# Patient Record
Sex: MALE | URBAN - METROPOLITAN AREA
[De-identification: names, ages, dates, MRNs, and addresses within clinical notes are randomized per-mention and may not be internally consistent; named-entity substitution may affect disease eponyms.]

---

## 2020-02-16 ENCOUNTER — OFFICE VISIT (OUTPATIENT)
Dept: URGENT CARE | Age: 20
End: 2020-02-16

## 2020-02-16 VITALS
BODY MASS INDEX: 23.63 KG/M2 | TEMPERATURE: 99.5 F | HEIGHT: 66 IN | SYSTOLIC BLOOD PRESSURE: 138 MMHG | HEART RATE: 81 BPM | OXYGEN SATURATION: 99 % | WEIGHT: 147 LBS | RESPIRATION RATE: 18 BRPM | DIASTOLIC BLOOD PRESSURE: 70 MMHG

## 2020-02-16 DIAGNOSIS — R68.89 FLU-LIKE SYMPTOMS: Primary | ICD-10-CM

## 2020-02-16 LAB
FLUAV AG UPPER RESP QL IA.RAPID: NEGATIVE
FLUBV AG UPPER RESP QL IA.RAPID: NEGATIVE

## 2020-02-16 PROCEDURE — 87804 INFLUENZA ASSAY W/OPTIC: CPT | Performed by: PHYSICIAN ASSISTANT

## 2020-02-16 PROCEDURE — 99203 OFFICE O/P NEW LOW 30 MIN: CPT | Performed by: PHYSICIAN ASSISTANT

## 2020-02-16 RX ORDER — BENZONATATE 200 MG/1
200 CAPSULE ORAL 3 TIMES DAILY PRN
Qty: 21 CAPSULE | Refills: 0 | Status: SHIPPED | OUTPATIENT
Start: 2020-02-16 | End: 2020-02-23

## 2020-02-16 ASSESSMENT — ENCOUNTER SYMPTOMS
FEVER: 1
SORE THROAT: 0
SINUS PAIN: 0
COUGH: 1

## 2024-05-07 ENCOUNTER — OFFICE VISIT (OUTPATIENT)
Dept: INTERNAL MEDICINE CLINIC | Facility: CLINIC | Age: 24
End: 2024-05-07

## 2024-05-07 VITALS
HEIGHT: 65.5 IN | OXYGEN SATURATION: 99 % | WEIGHT: 156 LBS | BODY MASS INDEX: 25.68 KG/M2 | HEART RATE: 99 BPM | DIASTOLIC BLOOD PRESSURE: 82 MMHG | SYSTOLIC BLOOD PRESSURE: 130 MMHG | TEMPERATURE: 98 F

## 2024-05-07 DIAGNOSIS — Z83.438 FAMILY HISTORY OF HYPERLIPIDEMIA: ICD-10-CM

## 2024-05-07 DIAGNOSIS — Z13.29 SCREENING FOR THYROID DISORDER: ICD-10-CM

## 2024-05-07 DIAGNOSIS — Z00.00 ANNUAL PHYSICAL EXAM: Primary | ICD-10-CM

## 2024-05-07 DIAGNOSIS — Z13.1 SCREENING FOR DIABETES MELLITUS: ICD-10-CM

## 2024-05-07 DIAGNOSIS — Z13.220 SCREENING FOR LIPOID DISORDERS: ICD-10-CM

## 2024-05-07 DIAGNOSIS — Z13.0 SCREENING FOR DEFICIENCY ANEMIA: ICD-10-CM

## 2024-05-07 PROCEDURE — 99385 PREV VISIT NEW AGE 18-39: CPT | Performed by: INTERNAL MEDICINE

## 2024-05-07 PROCEDURE — 3075F SYST BP GE 130 - 139MM HG: CPT | Performed by: INTERNAL MEDICINE

## 2024-05-07 PROCEDURE — 3008F BODY MASS INDEX DOCD: CPT | Performed by: INTERNAL MEDICINE

## 2024-05-07 PROCEDURE — 3079F DIAST BP 80-89 MM HG: CPT | Performed by: INTERNAL MEDICINE

## 2024-05-07 NOTE — H&P
Nick Patel is a 23 year old male.    HPI:     Chief Complaint   Patient presents with    Establish Care     Mr. PATEL is a 23 year old male coming in for annual physical examination    Overall doing well. In his past physicals may have had some high intermittent blood pressures.  Concerned as he does have some family history of high blood pressure.    Used to have headaches - chronic migraines. It is about a year since.     Sleep 6-8 hours of sleep depending on white shift goes.     Some anxiety, describes self as overthinker sometimes. No restrictions on his activity however.  This is well manageable, does not need assistance with daily stress management    He does have some skin spots.  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  Some other freckles, but no pain, itchiness to these areas.    I reviewed and updated the PMHx, FamHx, medications, allergies, and SocHx as below with the patient    SocHX  - Home: feels safe at home; with fiancee. Moved back from [place].   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 Sports - volleyball.  - Nutrition: Mediterranean diet - nuts, salad bar at work. Beets/cucumbers/lópez beans. Not as much fried foods, red meat. More fish and white meat - chicken. Water  - Physical Activity: walking the dog, volleyball/pickleball 1-2x a week. Gym 1-2x a week 3x during the week      HISTORY:  History reviewed. No pertinent past medical history.   Past Surgical History:   Procedure Laterality Date    Repeat root canal molar      age 11    Tonsillectomy Bilateral     age 6      Family History   Problem Relation Age of Onset    Hypertension Mother     Lipids Mother     Other (Headache) Mother     Diabetes Father     Hypertension Father     Lipids Father       Social History:   Social History     Socioeconomic History    Marital status: Single   Tobacco Use    Smoking status: Never     Smokeless tobacco: Never   Vaping Use    Vaping status: Never Used   Substance and Sexual Activity    Alcohol use: Yes     Alcohol/week: 2.0 standard drinks of alcohol     Types: 2 Standard drinks or equivalent per week    Drug use: Never     Social Determinants of Health     Food Insecurity: No Food Insecurity (9/1/2022)    Received from [facility]    Food Insecurity     Within the past 30 days, I worried whether my food would run out before I got money to buy more. / En los últimos 30 días, me preocupó que la comida se podía acabar antes de tener dinero para compr...: Never true / Nunca     Within the past 30 days, the food that I bought just didn't last, and I didn't have money to get more. / En los últimos 30 días, La comida que compré no rindió lo suficiente, y no tenía dinero para...: Never true / Nunca        Medications (Active prior to today's visit):  No current outpatient medications on file.       Allergies:  No Known Allergies      ROS:   Positive Findings indicated in BOLD    Constitutional: Fever, Chills, Weight Gain, Weight Loss, Night Sweats, Fatigue, Malaise  ENT/Mouth:  Hearing Changes, Ear Pain, Nasal Congestion, Sinus Pain, Hoarseness, Sore throat, Rhinorrhea, Swallowing Difficulty  Eyes: Eye Pain, Swelling, Redness, Foreign Body, Discharge, Vision Changes  Cardiovascular: Chest Pain, SOB, PND, Dyspnea on Exertion, Orthopnea, Claudication, Edema, Palpitations  Respiratory: Cough, Sputum, Wheezing, Shortness of breath  Gastrointestinal: Nausea, Vomiting, Diarrhea, Constipation, Pain, Heartburn, Dysphagia, Bloody stools, Tarry stools  Genitourinary: Dysmenorrhea, Dysuria, Urinary Frequency, Hematuria, Urinary Incontinence, Urgency,  Flank Pain  Musculoskeletal: Arthralgias, Myalgias, Joint Swelling, Joint Stiffness, Back Pain, Neck Pain  Integumentary: Skin Lesions, Pruritis, Hair Changes, Jaundice, Nail changes  Neuro: Weakness, Numbness, Paresthesias, Loss of Consciousness,  Syncope, Dizziness, Headache, Falls  Psych: Anxiety, Depression, Insomnia, Suicidal Ideation, Homicidal ideation, Memory Changes  Heme/Lymph: Bruising, Bleeding, Lymphadenopathy  Endocrine: Polyuria, Polydipsia, Temperature Intolerance    PHYSICAL EXAM:   Vital Signs:  Blood pressure 130/82, pulse 99, temperature 98.3 °F (36.8 °C), height 5' 5.5\" (1.664 m), weight 156 lb (70.8 kg), SpO2 99%.     Constitutional: No acute distress. Alert and oriented x 3.  Eyes: EOMI, PERRLA, clear sclera b/l  HENT: NCAT, Moist mucous membranes, Oropharynx without erythema or exudates  Neck: No JVD, no thyromegaly  Cardiovascular: S1, S2, no S3, no S4, Regular rate and rhythm, No murmurs/gallops/rubs.   Vascular: Equal pulses 2+ carotids no bruits or thrills/radial/DP/PT bilaterally  Respiratory: Clear to auscultation bilaterally.  No wheezes/rales/rhonchi  Gastrointestinal: Soft, nontender, nondistended. Positive bowel sounds x 4. No rebound tenderness. No hepatomegaly, No splenomegaly  Genitourinary: No CVA tenderness bilaterally  Neurologic: No focal neurological deficits, CN II-XII intact, light touch intact, MSK Strength 5/5 and symmetric in all extremities, normal gait, 2+ patellar tendon  Musculoskeletal: Full range of motion of all extremities, no clubbing/swelling/edema  Skin: + Left forearm hemangioma, intermittent scattered freckles/macules.  Psychiatric: Appropriate mood and affect  Heme/Lymph/Immune: No cervical LAD      DATA REVIEWED   Labs:  No results found for this or any previous visit (from the past 8760 hour(s)).    No results found for this or any previous visit (from the past 8760 hour(s)).    No results found for: \"CHOLEST\", \"HDL\", \"LDL\", \"TRIGLY\", \"TRIG\"    Last A1c value was  % done  .          ASSESSMENT/PLAN:     Whitecoat syndrome  - Blood pressure slightly elevated in the 130s systolic even on repeat.  - Does report history of mildly high blood pressure/borderline blood pressure elevation in the past  -  Suspect whitecoat hypertension/whitecoat syndrome.  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  This has not changed.  He does have some intermittent freckles, macules that appear benign.  We did discuss the ABCDEs of melanoma and suspicious skin lesions.  - Amenable to fasting blood work as below.  Family history of diabetes, hyperlipidemia.  - Otherwise he is doing very well in terms of nutrition, exercise, managing stress.  Goal is to maintain this optimal state of health moving forward         Orders This Visit:  Orders Placed This Encounter   Procedures    Comp Metabolic Panel (14)    CBC With Differential With Platelet    Lipid Panel    TSH W Reflex To Free T4       Meds This Visit:  Requested Prescriptions      No prescriptions requested or ordered in this encounter       Imaging & Referrals:  None       Health Maintenance  HTN Screen: BP at goal  DM Screen: Check fasting sugar  HLD Screen: Check fasting lipid panel  HCV Screen: Considered low risk  HIV Screen: considered low risk  G/C/Syphilis: Considered low risk    Colon Cancer Screening (45-70): Not indicated  Prostate Cancer Screening: (50-70): Not indicated  Lung Cancer Screening (55-79 with 30 p/year and active < 15 years): Not indicated  AAA Screening (65-75 Hx of smoking): Not indicated    Influenza: Annually   Td/Tdap: Last Tdap 2022, due 2032  Zoster (50+): Not indicated  HPV (19-26): Due for Gardasil vaccine series  Pneumococcal: Not indicated    Immunization History   Administered Date(s) Administered    Covid-19 Vaccine Pfizer 30 mcg/0.3 ml 02/20/2021, 03/09/2021, 12/20/2021    DTAP 04/05/2001    DTAP INFANRIX 10/25/1999, 12/29/1999, 03/07/2000, 08/28/2003    FLUMIST NASAL 2 YR-49 YRS (34113) 10/17/2013    HEP A,Ped/Adol,(2 Dose) 05/13/2010, 08/15/2012    HEP B, Ped/Adol 08/27/1999, 10/25/1999, 12/27/1999,  07/28/2014    HPV (Gardasil) 03/09/2011, 08/15/2012, 08/19/2013    Hib, Unspecified Formulation 10/25/1999, 12/27/1999, 12/29/1999, 04/05/2001, 06/03/2002    IPV 10/25/1999, 12/29/1999, 04/05/2001, 08/28/2003    Influenza 09/29/2020    MMR 03/22/2001, 08/28/2003    Meningococcal-Menactra 08/15/2012, 09/21/2015    OPV 12/27/1999, 04/05/2001    TDAP 05/13/2010, 09/01/2022    Tb Intradermal Test 05/15/2010    Varicella 03/22/2001, 05/13/2010         Return to clinic in 1 year for annual physical examination    Jl Mayorga MD, 05/07/24, 3:27 PM

## 2024-05-07 NOTE — PATIENT INSTRUCTIONS
- You were seen in clinic for regular annual check-up. We have ordered labs for you and we will call you with the results. Please obtain the bloodwork fasting for 10**12 hours. OK to drink water the day of your blood draw.      We have the lab downstairs on the first floor.  No appointment is necessary.  Lab hours are Monday-Friday 7:30 AM to 4:45 PM.  There may be Saturday hours 7 AM-11:00 AM as well.     Otherwise you can obtain the blood tests on the weekends at the main hospital or local immediate care/urgent care within the [facility].    Unless there is any significant abnormal blood test, we do not need to check annual blood test until after the age of 30.  It is reasonable to get a baseline to see how your cholesterol levels and glucose levels/sugar levels look given family history of these conditions    Blood pressure was only slightly high on repeat check.  This could be consistent with whitecoat syndrome/whitecoat hypertension which does not require medical treatment.  - Check your blood pressure periodically at home as they may be lower outside of the medical setting  - You are otherwise doing very well with good balanced nutrition, staying active throughout the daytime with exercise, and managing stress.    -We did discuss the ABCDEs of skin lesions.  These consist of asymmetry, irregular borders, abnormal coloration, diameter increase/size increase, evolution or changes.    Birthmark on the left arm is likely a strawberry hemangioma, these are benign collections of blood vessels.  Look out for any changes in this area    You have some benign-appearing moles and freckles throughout the body.  No need for evaluation with dermatology at this time    -Vaccines he may be due for: Up-to-date on all your major vaccines  - Please continue to eat a varied diet including recommended servings of vegetables, fruits, and low fat dairy. Minimize high saturated fats (such as fast foods) and high sugar  intake (such as soda)  - We recommend 150 minutes of moderate intensity exercise (brisk walking, swimming) weekly to maintain your current weight.  Targeted weight loss will require more vigorous exercise or more than 150 minutes/week.    Return to clinic in 1 year for annual physical examination

## 2024-08-21 ENCOUNTER — LAB ENCOUNTER (OUTPATIENT)
Dept: LAB | Age: 24
End: 2024-08-21
Attending: INTERNAL MEDICINE
Payer: COMMERCIAL

## 2024-08-21 DIAGNOSIS — Z13.1 SCREENING FOR DIABETES MELLITUS: ICD-10-CM

## 2024-08-21 DIAGNOSIS — Z13.220 SCREENING FOR LIPOID DISORDERS: ICD-10-CM

## 2024-08-21 DIAGNOSIS — Z13.0 SCREENING FOR DEFICIENCY ANEMIA: ICD-10-CM

## 2024-08-21 DIAGNOSIS — Z00.00 ANNUAL PHYSICAL EXAM: ICD-10-CM

## 2024-08-21 DIAGNOSIS — Z13.29 SCREENING FOR THYROID DISORDER: ICD-10-CM

## 2024-08-21 LAB
ALBUMIN SERPL-MCNC: 4.9 G/DL (ref 3.2–4.8)
ALBUMIN/GLOB SERPL: 1.8 {RATIO} (ref 1–2)
ALP LIVER SERPL-CCNC: 77 U/L
ALT SERPL-CCNC: 26 U/L
ANION GAP SERPL CALC-SCNC: 6 MMOL/L (ref 0–18)
AST SERPL-CCNC: 26 U/L (ref ?–34)
BASOPHILS # BLD AUTO: 0.05 X10(3) UL (ref 0–0.2)
BASOPHILS NFR BLD AUTO: 0.6 %
BILIRUB SERPL-MCNC: 0.7 MG/DL (ref 0.3–1.2)
BUN BLD-MCNC: 10 MG/DL (ref 9–23)
BUN/CREAT SERPL: 10.2 (ref 10–20)
CALCIUM BLD-MCNC: 9.9 MG/DL (ref 8.7–10.4)
CHLORIDE SERPL-SCNC: 106 MMOL/L (ref 98–112)
CHOLEST SERPL-MCNC: 213 MG/DL (ref ?–200)
CO2 SERPL-SCNC: 27 MMOL/L (ref 21–32)
CREAT BLD-MCNC: 0.98 MG/DL
DEPRECATED RDW RBC AUTO: 37.8 FL (ref 35.1–46.3)
EGFRCR SERPLBLD CKD-EPI 2021: 110 ML/MIN/1.73M2 (ref 60–?)
EOSINOPHIL # BLD AUTO: 0.19 X10(3) UL (ref 0–0.7)
EOSINOPHIL NFR BLD AUTO: 2.3 %
ERYTHROCYTE [DISTWIDTH] IN BLOOD BY AUTOMATED COUNT: 11.9 % (ref 11–15)
FASTING PATIENT LIPID ANSWER: YES
FASTING STATUS PATIENT QL REPORTED: YES
GLOBULIN PLAS-MCNC: 2.7 G/DL (ref 2–3.5)
GLUCOSE BLD-MCNC: 96 MG/DL (ref 70–99)
HCT VFR BLD AUTO: 43.7 %
HDLC SERPL-MCNC: 45 MG/DL (ref 40–59)
HGB BLD-MCNC: 15.3 G/DL
IMM GRANULOCYTES # BLD AUTO: 0.02 X10(3) UL (ref 0–1)
IMM GRANULOCYTES NFR BLD: 0.2 %
LDLC SERPL CALC-MCNC: 136 MG/DL (ref ?–100)
LYMPHOCYTES # BLD AUTO: 3.67 X10(3) UL (ref 1–4)
LYMPHOCYTES NFR BLD AUTO: 44.1 %
MCH RBC QN AUTO: 30.8 PG (ref 26–34)
MCHC RBC AUTO-ENTMCNC: 35 G/DL (ref 31–37)
MCV RBC AUTO: 88.1 FL
MONOCYTES # BLD AUTO: 0.5 X10(3) UL (ref 0.1–1)
MONOCYTES NFR BLD AUTO: 6 %
NEUTROPHILS # BLD AUTO: 3.9 X10 (3) UL (ref 1.5–7.7)
NEUTROPHILS # BLD AUTO: 3.9 X10(3) UL (ref 1.5–7.7)
NEUTROPHILS NFR BLD AUTO: 46.8 %
NONHDLC SERPL-MCNC: 168 MG/DL (ref ?–130)
OSMOLALITY SERPL CALC.SUM OF ELEC: 287 MOSM/KG (ref 275–295)
PLATELET # BLD AUTO: 221 10(3)UL (ref 150–450)
POTASSIUM SERPL-SCNC: 3.9 MMOL/L (ref 3.5–5.1)
PROT SERPL-MCNC: 7.6 G/DL (ref 5.7–8.2)
RBC # BLD AUTO: 4.96 X10(6)UL
SODIUM SERPL-SCNC: 139 MMOL/L (ref 136–145)
TRIGL SERPL-MCNC: 176 MG/DL (ref 30–149)
TSI SER-ACNC: 2.16 MIU/ML (ref 0.55–4.78)
VLDLC SERPL CALC-MCNC: 32 MG/DL (ref 0–30)
WBC # BLD AUTO: 8.3 X10(3) UL (ref 4–11)

## 2024-08-21 PROCEDURE — 80061 LIPID PANEL: CPT

## 2024-08-21 PROCEDURE — 80053 COMPREHEN METABOLIC PANEL: CPT

## 2024-08-21 PROCEDURE — 36415 COLL VENOUS BLD VENIPUNCTURE: CPT

## 2024-08-21 PROCEDURE — 84443 ASSAY THYROID STIM HORMONE: CPT

## 2024-08-21 PROCEDURE — 85025 COMPLETE CBC W/AUTO DIFF WBC: CPT

## 2024-08-22 ENCOUNTER — TELEPHONE (OUTPATIENT)
Dept: INTERNAL MEDICINE CLINIC | Facility: CLINIC | Age: 24
End: 2024-08-22

## 2024-08-22 NOTE — TELEPHONE ENCOUNTER
Please notify the patient that the cholesterol levels were elevated but no medication we need at this time.  We should try to focus on good vegetable, fruit intake and lean meats such as chicken and fish that slightly baked or oil, try to minimize red meat.  From our visit I believe he does follow the Mediterranean diet which is a good nutrition plan to follow    Keep up with good exercise to maintain a healthy weight.  No other new recommendations for now

## 2024-08-22 NOTE — TELEPHONE ENCOUNTER
[employer] message sent to patient with Dr Mayorga' message. Patient to contact office for any further questions.

## 2025-02-23 NOTE — PATIENT INSTRUCTIONS
You were seen in clinic today for follow-up.  Today, we ordered some blood test to be completed anytime prior to your next visit    For shoulder pain, we recommended:  Lets proceed with x-ray of the right shoulder to determine any structural abnormalities of the joint space.  -Would recommend initial trial of conservative therapy:    -Acetaminophen 500-650 mg every 4-6 hours as needed for pain relief  -Ibuprofen 200-400 mg over-the-counter twice a day for 5 days even if symptoms are improved.  -For more severe pain, should notify us as we can consider alternative medication  -Trial of physical therapy can be considered if home stretches and exercises insufficient    Similarly, we will proceed with conservative measures of the right knee.  No immediate x-rays needed at this time  - Lets try some stretches and exercises  - Use of a knee brace or compression brace especially during times of activities may help    As there is been changes to the skin, we will proceed with dermatology evaluation.    Periodically check blood pressures at home as we do have an element of whitecoat hypertension    Will proceed with dermatology evaluation as there is some irregularities of the left neck area.  - We discussed the ABCDEs of concerning moles, monitoring for melanoma/skin cancer in the future    Return to clinic in 1 year for annual physical exam

## 2025-02-23 NOTE — PROGRESS NOTES
Nick Patel is a 24 year old male.    HPI:     Chief Complaint   Patient presents with    Physical     Reviewed Preventative/Wellness form with patient.  Rt shoulder pain with certain movements x3ish months   Intermittent Rt knee pain   New moles - Gown for skin check   Started OTC Omeprazole with some relief  Migraines have decreased      Mr. PATEL is a 24 year old male past medical history of hyperlipidemia who presents today for follow-up.    Shoulder pain first started 3-4 months ago. Was playing volleyball for most of the last year. 1x every 2 weeks. He went to spike the ball and felt a sharp pain to the area. Now with lifting weights, he doesn't feel it. PLays volleyball 1-3 hours.     Right knee a few months ago, it was 1x. Last week he may not have stretched enough. Lunging with the R knee. He decreased weight of the R knee. Achy pain.     Changes of the skin in the neck collar area L, R ear. Not enlarging. No pain nor itchiness.     Migraines have been better controlled.  Tylenol as needed. 1-2x a month. Rarely, may be related to stress. 1-5, without coffee. 6-8 hours of rest/sleep.     Appetite - Mediterranean diet, more veggies and fruits. Not as much red meat. Snacking croissants. Omeprazole uses from time to time with fried foods. There is family history of GERD    HISTORY:  History reviewed. No pertinent past medical history.   Past Surgical History:   Procedure Laterality Date    Repeat root canal molar      age 11    Tonsillectomy Bilateral     age 6      Family History   Problem Relation Age of Onset    Hypertension Mother     Lipids Mother     Other (Headache) Mother     Other (GERD) Mother     Diabetes Father     Hypertension Father     Lipids Father     Other (GERD) Father     Diabetes Maternal Grandmother     Diabetes Maternal Grandfather     Diabetes Paternal Grandmother     Diabetes Paternal Grandfather       Social History:   Social History     Socioeconomic History    Marital status: Engaged    Tobacco Use    Smoking status: Never    Smokeless tobacco: Never   Vaping Use    Vaping status: Never Used   Substance and Sexual Activity    Alcohol use: Yes     Alcohol/week: 2.0 standard drinks of alcohol     Types: 2 Standard drinks or equivalent per week     Comment: socially    Drug use: Never     Social Drivers of Health     Food Insecurity: No Food Insecurity (9/1/2022)    Received from [facility]    Food Insecurity     Within the past 30 days, I worried whether my food would run out before I got money to buy more. / En los últimos 30 días, me preocupó que la comida se podía acabar antes de tener dinero para compr...: Never true / Nunca     Within the past 30 days, the food that I bought just didn't last, and I didn't have money to get more. / En los últimos 30 días, La comida que compré no rindió lo suficiente, y no tenía dinero para...: Never true / Nunca        Medications (Active prior to today's visit):  Current Outpatient Medications   Medication Sig Dispense Refill    omeprazole 20 MG Oral Capsule Delayed Release Take 1 capsule (20 mg total) by mouth daily as needed.      Multiple Vitamins-Minerals (IMMUNE SUPPORT OR) Take 1 tablet by mouth daily as needed.         Allergies:  No Known Allergies      ROS:   Positive Findings indicated in BOLD    Constitutional: Fever, Chills, Weight Gain, Weight Loss, Night Sweats, Fatigue, Malaise  ENT/Mouth:  Hearing Changes, Ear Pain, Nasal Congestion, Sinus Pain, Hoarseness, Sore throat, Rhinorrhea, Swallowing Difficulty  Eyes: Eye Pain, Swelling, Redness, Foreign Body, Discharge, Vision Changes  Cardiovascular: Chest Pain, SOB, PND, Dyspnea on Exertion, Orthopnea, Claudication, Edema, Palpitations  Respiratory: Cough, Sputum, Wheezing, Shortness of breath  Gastrointestinal: Nausea, Vomiting, Diarrhea, Constipation, Pain, Heartburn, Dysphagia, Bloody stools, Tarry stools  Genitourinary: Dysmenorrhea, Dysuria, Urinary Frequency, Hematuria,  Urinary Incontinence, Urgency,  Flank Pain  Musculoskeletal: Arthralgias, Myalgias, Joint Swelling, Joint Stiffness, Back Pain, Neck Pain  Integumentary: Skin Lesions, Pruritis, Hair Changes, Jaundice, Nail changes  Neuro: Weakness, Numbness, Paresthesias, Loss of Consciousness, Syncope, Dizziness, Headache, Falls  Psych: Anxiety, Depression, Insomnia, Suicidal Ideation, Homicidal ideation, Memory Changes  Heme/Lymph: Bruising, Bleeding, Lymphadenopathy  Endocrine: Polyuria, Polydipsia, Temperature Intolerance    PHYSICAL EXAM:   Vital Signs:  Blood pressure 122/66, pulse 91, temperature 98.6 °F (37 °C), temperature source Tympanic, height 5' 5.5\" (1.664 m), weight 154 lb (69.9 kg), SpO2 99%.     Constitutional: No acute distress. Alert and oriented x 3.  Eyes: EOMI, PERRLA, clear sclera b/l  HENT: NCAT, Moist mucous membranes, Oropharynx without erythema or exudates  Neck: No JVD, no thyromegaly  Cardiovascular: S1, S2, no S3, no S4, Regular rate and rhythm, No murmurs/gallops/rubs.   Vascular: Equal pulses 2+ carotids no bruits or thrills/radial/DP/PT bilaterally  Respiratory: Clear to auscultation bilaterally.  No wheezes/rales/rhonchi  Gastrointestinal: Soft, nontender, nondistended. Positive bowel sounds x 4. No rebound tenderness. No hepatomegaly, No splenomegaly  Genitourinary: No CVA tenderness bilaterally  Neurologic: No focal neurological deficits, CN II-XII intact, light touch intact, MSK Strength 5/5 and symmetric in all extremities, normal gait, 2+ patellar tendon  Musculoskeletal: Full range of motion of all extremities, no clubbing/swelling/edema  Skin: + Left forearm hemangioma, intermittent scattered freckles/macules.  + Interval nevus of the left collarbone area, right mastoid area-targetoid lesion  Psychiatric: Appropriate mood and affect  Heme/Lymph/Immune: No cervical LAD      DATA REVIEWED   Labs:  Recent Results (from the past 8760 hours)   CBC With Differential With Platelet    Collection  Time: 08/21/24  9:04 AM   Result Value Ref Range    WBC 8.3 4.0 - 11.0 x10(3) uL    RBC 4.96 4.30 - 5.70 x10(6)uL    HGB 15.3 13.0 - 17.5 g/dL    HCT 43.7 39.0 - 53.0 %    MCV 88.1 80.0 - 100.0 fL    MCH 30.8 26.0 - 34.0 pg    MCHC 35.0 31.0 - 37.0 g/dL    RDW-SD 37.8 35.1 - 46.3 fL    RDW 11.9 11.0 - 15.0 %    .0 150.0 - 450.0 10(3)uL    Neutrophil Absolute Prelim 3.90 1.50 - 7.70 x10 (3) uL    Neutrophil Absolute 3.90 1.50 - 7.70 x10(3) uL    Lymphocyte Absolute 3.67 1.00 - 4.00 x10(3) uL    Monocyte Absolute 0.50 0.10 - 1.00 x10(3) uL    Eosinophil Absolute 0.19 0.00 - 0.70 x10(3) uL    Basophil Absolute 0.05 0.00 - 0.20 x10(3) uL    Immature Granulocyte Absolute 0.02 0.00 - 1.00 x10(3) uL    Neutrophil % 46.8 %    Lymphocyte % 44.1 %    Monocyte % 6.0 %    Eosinophil % 2.3 %    Basophil % 0.6 %    Immature Granulocyte % 0.2 %     *Note: Due to a large number of results and/or encounters for the requested time period, some results have not been displayed. A complete set of results can be found in Results Review.       Recent Results (from the past 8760 hours)   Comp Metabolic Panel (14)    Collection Time: 08/21/24  9:04 AM   Result Value Ref Range    Glucose 96 70 - 99 mg/dL    Sodium 139 136 - 145 mmol/L    Potassium 3.9 3.5 - 5.1 mmol/L    Chloride 106 98 - 112 mmol/L    CO2 27.0 21.0 - 32.0 mmol/L    Anion Gap 6 0 - 18 mmol/L    BUN 10 9 - 23 mg/dL    Creatinine 0.98 0.70 - 1.30 mg/dL    BUN/CREA Ratio 10.2 10.0 - 20.0    Calcium, Total 9.9 8.7 - 10.4 mg/dL    Calculated Osmolality 287 275 - 295 mOsm/kg    eGFR-Cr 110 >=60 mL/min/1.73m2    ALT 26 10 - 49 U/L    AST 26 <34 U/L    Alkaline Phosphatase 77 45 - 117 U/L    Bilirubin, Total 0.7 0.3 - 1.2 mg/dL    Total Protein 7.6 5.7 - 8.2 g/dL    Albumin 4.9 (H) 3.2 - 4.8 g/dL    Globulin  2.7 2.0 - 3.5 g/dL    A/G Ratio 1.8 1.0 - 2.0    Patient Fasting for CMP? Yes      *Note: Due to a large number of results and/or encounters for the requested time  period, some results have not been displayed. A complete set of results can be found in Results Review.       Cholesterol, Total (mg/dL)   Date Value   08/21/2024 213 (H)     HDL Cholesterol (mg/dL)   Date Value   08/21/2024 45     LDL Cholesterol (mg/dL)   Date Value   08/21/2024 136 (H)     Triglycerides (mg/dL)   Date Value   08/21/2024 176 (H)       Last A1c value was  % done  .          ASSESSMENT/PLAN:     Shoulder pain  More so with biking activity with volleyball.  Ongoing over the last few months, has had to decrease his activities  - Will check an x-ray of the right shoulder  - Lindo/Neer//belly rub test within normal limits.  Liftoff test was positive localizing to subscapularis.  - Suspect this is related to impingement syndrome, rotator cuff tendinopathy.  -Would recommend initial trial of conservative therapy:    -Acetaminophen 500-650 mg every 4-6 hours as needed for pain relief  -Ibuprofen 200-400 mg twice a day as needed  -For more severe pain, should notify us as we can consider alternative medication  -Trial of physical therapy can be considered if home stretches and exercises insufficient    Right knee pain  - Localized to the quadriceps, full active range of motion and intact knee joint on exam  - Similar comanagement as above  - Exercises of the right knee.    Whitecoat syndrome  - Blood pressure slightly elevated in the 130s systolic even on repeat.  - Does report history of mildly high blood pressure/borderline blood pressure elevation in the past  - Suspect whitecoat hypertension/whitecoat syndrome.  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  This has not changed.  He does have some intermittent freckles, macules that appear benign.  We did discuss the ABCDEs of melanoma and suspicious skin lesions.  - Will refer to dermatology given changes in moles.        Orders This Visit:  Orders Placed This Encounter   Procedures    Lipid Panel    Basic Metabolic Panel (8) [E]    Hemoglobin A1C       Meds This Visit:  Requested Prescriptions      No prescriptions requested or ordered in this encounter       Imaging & Referrals:  DERM - INTERNAL  XR SHOULDER, COMPLETE (MIN 2 VIEWS), RIGHT (CPT=73030)       Health Maintenance  HTN Screen: BP at goal  DM Screen: Check fasting sugar  HLD Screen: Check fasting lipid panel  HCV Screen: Considered low risk  HIV Screen: considered low risk  G/C/Syphilis: Considered low risk    Colon Cancer Screening (45-70): Not indicated  Prostate Cancer Screening: (50-70): Not indicated  Lung Cancer Screening (55-79 with 30 p/year and active < 15 years): Not indicated  AAA Screening (65-75 Hx of smoking): Not indicated    Influenza: Annually   Td/Tdap: Last Tdap 2022, due 2032  Zoster (50+): Not indicated  HPV (19-26): Due for Gardasil vaccine series  Pneumococcal: Not indicated    Immunization History   Administered Date(s) Administered    Covid-19 Vaccine Pfizer 30 mcg/0.3 ml 02/20/2021, 03/09/2021, 12/20/2021    DTAP 10/25/1999, 12/29/1999, 03/07/2000, 04/05/2001, 08/28/2003    FLUMIST NASAL 2 YR-49 YRS (34490) 10/17/2013    Flucelvax Influenza vaccine, trivalent (ccIIV3), 0.5mL IM 11/03/2024    HEP A,Ped/Adol,(2 Dose) 05/13/2010, 08/15/2012    HEP B, Ped/Adol 08/27/1999, 10/25/1999, 12/27/1999, 07/28/2014    HPV (Gardasil) 03/09/2011, 08/15/2012, 08/19/2013    Hib, Unspecified Formulation 10/25/1999, 12/27/1999, 12/29/1999, 04/05/2001, 06/03/2002    IPV 10/25/1999, 12/29/1999, 04/05/2001, 08/28/2003    Influenza 09/29/2020    MMR 03/22/2001, 08/28/2003    Meningococcal-Menactra 08/15/2012, 09/21/2015    Meningococcal-Menveo 2month-55yr 08/15/2012    OPV 12/27/1999, 04/05/2001    TDAP 05/13/2010, 09/01/2022    Tb Intradermal Test 05/15/2010    Varicella 03/22/2001, 05/13/2010         Return to clinic in 6 months for follow-up    Jl Mayorga MD,  02/24/25, 5:46 PM

## 2025-02-24 ENCOUNTER — OFFICE VISIT (OUTPATIENT)
Dept: INTERNAL MEDICINE CLINIC | Facility: CLINIC | Age: 25
End: 2025-02-24

## 2025-02-24 VITALS
WEIGHT: 154 LBS | DIASTOLIC BLOOD PRESSURE: 66 MMHG | HEART RATE: 91 BPM | OXYGEN SATURATION: 99 % | TEMPERATURE: 99 F | BODY MASS INDEX: 25.35 KG/M2 | SYSTOLIC BLOOD PRESSURE: 122 MMHG | HEIGHT: 65.5 IN

## 2025-02-24 DIAGNOSIS — D22.9 ATYPICAL NEVUS: ICD-10-CM

## 2025-02-24 DIAGNOSIS — M25.519 SHOULDER PAIN, UNSPECIFIED CHRONICITY, UNSPECIFIED LATERALITY: Primary | ICD-10-CM

## 2025-02-24 DIAGNOSIS — R23.9 UNSPECIFIED SKIN CHANGES: ICD-10-CM

## 2025-02-24 DIAGNOSIS — Z13.29 SCREENING FOR THYROID DISORDER: ICD-10-CM

## 2025-02-24 DIAGNOSIS — Z83.3 FAMILY HISTORY OF DIABETES MELLITUS: ICD-10-CM

## 2025-02-24 DIAGNOSIS — Z83.438 FAMILY HISTORY OF HYPERLIPIDEMIA: ICD-10-CM

## 2025-02-24 DIAGNOSIS — Z13.0 SCREENING FOR DEFICIENCY ANEMIA: ICD-10-CM

## 2025-02-24 DIAGNOSIS — E78.2 MIXED HYPERLIPIDEMIA: ICD-10-CM

## 2025-02-24 DIAGNOSIS — Z13.1 SCREENING FOR DIABETES MELLITUS: ICD-10-CM

## 2025-02-24 RX ORDER — OMEPRAZOLE 20 MG/1
20 CAPSULE, DELAYED RELEASE ORAL DAILY PRN
COMMUNITY

## 2025-03-17 ENCOUNTER — HOSPITAL ENCOUNTER (OUTPATIENT)
Dept: GENERAL RADIOLOGY | Facility: HOSPITAL | Age: 25
Discharge: HOME | End: 2025-03-17
Attending: INTERNAL MEDICINE
Payer: COMMERCIAL

## 2025-03-17 DIAGNOSIS — M25.519 SHOULDER PAIN, UNSPECIFIED CHRONICITY, UNSPECIFIED LATERALITY: ICD-10-CM

## 2025-03-17 PROCEDURE — 73030 X-RAY EXAM OF SHOULDER: CPT | Performed by: INTERNAL MEDICINE

## 2025-03-19 ENCOUNTER — TELEPHONE (OUTPATIENT)
Dept: INTERNAL MEDICINE CLINIC | Facility: CLINIC | Age: 25
End: 2025-03-19

## 2025-03-19 DIAGNOSIS — M25.511 CHRONIC RIGHT SHOULDER PAIN: Primary | ICD-10-CM

## 2025-03-19 DIAGNOSIS — G89.29 CHRONIC RIGHT SHOULDER PAIN: Primary | ICD-10-CM

## 2025-03-19 NOTE — TELEPHONE ENCOUNTER
Called patient and relayed   message - verbalized understanding information for DR. Vanegas sent via [employer]

## 2025-03-19 NOTE — TELEPHONE ENCOUNTER
Please notify the patient that the x-ray of the right shoulder showed some inflammation to the area without dislocation, fracture.    Due to duration of symptoms and chronic nature of symptoms, I would like him to see orthopedic surgery if his home stretches and exercises are insufficient:  Referred to Provider Information:  Provider Address Phone   Vijay Vanegas MD [address] [place] [address]  [facility] [address] [phone]     I anticipate Dr. Vanegas may recommend specific physical therapy but lets have him evaluate further if there are any other recommendations

## 2025-04-14 ENCOUNTER — OFFICE VISIT (OUTPATIENT)
Dept: DERMATOLOGY CLINIC | Facility: CLINIC | Age: 25
End: 2025-04-14

## 2025-04-14 DIAGNOSIS — D22.9 MULTIPLE BENIGN NEVI: Primary | ICD-10-CM

## 2025-04-14 DIAGNOSIS — Q27.9: ICD-10-CM

## 2025-04-14 PROCEDURE — 99203 OFFICE O/P NEW LOW 30 MIN: CPT | Performed by: STUDENT IN AN ORGANIZED HEALTH CARE EDUCATION/TRAINING PROGRAM

## 2025-04-14 NOTE — PROGRESS NOTES
New patient     Jl Mayorga MD [NPI: [id number]]     CHIEF COMPLAINT: Lesion     HISTORY OF PRESENT ILLNESS: .    1. Lesion   Location: Collarbone,  R ear   Duration: Months   Bleeding, growing, changing?: Yes; Growth   Scaly?:No    Itchy?:No    Current treatment: None   Past treatments: None     2. Lesion   Location: L Forearm    Duration: Years   Bleeding, growing, changing?: No  Scaly?:No    Itchy?:No    Current treatment: None   Past treatments: None     DERM HISTORY:  History of skin cancer: No  History of chronic skin disease/condition: No    FAMILY HISTORY:  History of melanoma: No    History/Other:    REVIEW OF SYSTEMS:  Constitutional: Denies fever, chills, unintentional weight loss.   Skin as per HPI    PAST MEDICAL HISTORY:  Past Medical History[1]    Medications  Current Medications[2]    Objective:    PHYSICAL EXAM:  General: awake, alert, no acute distress  Skin: Skin exam was performed today including the following: L forearm, neck and R ear. Pertinent findings include:   - neck and ear with regular brown macules  - L forearm with cherry red plaque    ASSESSMENT & PLAN:  Pathophysiology of diagnoses discussed with patient.  Therapeutic options reviewed. Risks, benefits, and alternatives discussed with patient. Instructions reviewed at length.    #Birthmark, L forearm (hemangioma)  - Reassured regarding benign nature. No treatment necessary unless symptomatic/changing.    #Multiple benign nevi  - Reassured patient of benign nature of these lesions.   - Return for lesions that are new, growing, changing or symptomatic.   - Recommend daily photoprotection with broad-spectrum sunscreen (SPF 30 daily with reapplication every 2 hours), avoidance of sun during peak hours, and sun protective clothing.   - Dermoscopy was used for physical examination of pigmented lesions during today's office visit.      Return to clinic: 1 yearor sooner if something concerning arises     Elier Santiago MD         [1] No  past medical history on file.  [2]   Current Outpatient Medications   Medication Sig Dispense Refill    omeprazole 20 MG Oral Capsule Delayed Release Take 1 capsule (20 mg total) by mouth daily as needed.      Multiple Vitamins-Minerals (IMMUNE SUPPORT OR) Take 1 tablet by mouth daily as needed.

## 2025-04-22 ENCOUNTER — TELEPHONE (OUTPATIENT)
Facility: CLINIC | Age: 25
End: 2025-04-22

## 2025-04-22 NOTE — TELEPHONE ENCOUNTER
Patient called to schedule for right shoulder injury. Please advise if further imaging is needed.   Future Appointments   Date Time Provider Department Center   5/5/2025 10:40 AM Gail Mcdaniel MD [facility]

## 2025-05-05 ENCOUNTER — OFFICE VISIT (OUTPATIENT)
Dept: ORTHOPEDICS CLINIC | Facility: CLINIC | Age: 25
End: 2025-05-05
Payer: COMMERCIAL

## 2025-05-05 VITALS — BODY MASS INDEX: 25.35 KG/M2 | HEIGHT: 65.5 IN | WEIGHT: 154 LBS

## 2025-05-05 DIAGNOSIS — M25.811 IMPINGEMENT OF RIGHT SHOULDER: Primary | ICD-10-CM

## 2025-05-05 PROCEDURE — 99204 OFFICE O/P NEW MOD 45 MIN: CPT | Performed by: ORTHOPAEDIC SURGERY

## 2025-05-05 PROCEDURE — 3008F BODY MASS INDEX DOCD: CPT | Performed by: ORTHOPAEDIC SURGERY

## 2025-05-05 NOTE — PROGRESS NOTES
[facility] New Consult      Chief Complaint   Patient presents with    Shoulder Pain     RIGHT SHOULDER  -Denies any tx     HPI: The patient is a 24 year old right-hand-dominant male referred for orthopaedic consultation by Dr. Jl Mayorga with complaints of chronic right shoulder pain.  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  He has noted some pain with overhead striking localized anteriorly and laterally.  He denies acute injury, significant loss of motion, weakness or radiation.  On occasion there is a clicking sensation but this is not consistently painful.  Fortunately, he has minimal symptoms at rest.  No formal treatment has been initiated.    Past Medical History[1]  Past Surgical History[2]  Current Medications[3]  Allergies[4]  Family History[5]  Social History     Occupational History    Not on file   Tobacco Use    Smoking status: Never    Smokeless tobacco: Never   Vaping Use    Vaping status: Never Used   Substance and Sexual Activity    Alcohol use: Yes     Alcohol/week: 2.0 standard drinks of alcohol     Types: 2 Standard drinks or equivalent per week     Comment: socially    Drug use: Never    Sexual activity: Not on file        ROS:  Complete ROS reviewed by me and non-contributory to the chief complaint except as mentioned above.    Physical Exam:    Ht 5' 5.5\" (1.664 m)   Wt 154 lb (69.9 kg)   BMI 25.24 kg/m²   Constitutional: Well developed, well nourished pleasant 24 year old male  Psychological: NAD, alert and appropriate  Respiratory: Breathing comfortably on room air with RR of 12-16  Cardiac: Palpable distal pulses with pink warm extremities distally  Examination of the C-spine reveals no palpable tenderness and adequate active range of motion with minimal complaint.  Right shoulder reveals no visible swelling, discoloration or deformity.  Active range of motion generates pain through the  impingement zone on forward elevation to approximately 165 degrees.  Similar findings are noted on abduction through the impingement zone to 140.  External rotation is symmetrical at about 55 degrees bilaterally with internal rotation well above the belt line.  Hawkin's test is painful anterior laterally with less pain on Speed's test and a well-tolerated Pacific's test.  Biceps groove is minimally tender.  AC joint is mildly prominent but nontender with adequate tolerance of crossarm adduction.  Rotator cuff strength test reveals intact full symmetrical power in all planes.  No instability on sulcus or load-and-shift.  Hand, wrist and elbow range of motion is within normal limits.  Distal neurovascular status is intact on sensory, motor and perfusion assessment.    Imaging: Multiple views right shoulder personally viewed, demonstrating no acute fracture dislocation or late glenohumeral degenerative changes.      XR SHOULDER, COMPLETE (MIN 2 VIEWS), RIGHT (CPT=73030)  Result Date: 3/19/2025  CONCLUSION: Probable right shoulder joint effusion with no underlying fracture/dislocation.    Dictated by (CST): Nickolas Reyna MD on 3/19/2025 at 1:16 PM     Finalized by (CST): Nickolas Reyna MD on 3/19/2025 at 1:16 PM              Assessment/Diagnoses:  Diagnoses and all orders for this visit:    Impingement of right shoulder  -     Physical Therapy Referral - [facility]      Plan: I reviewed imaging and exam findings with the patient.  Symptoms are consistent with rotator cuff tendinitis and impingement.  Referencing the patient's imaging studies and a plastic shoulder model, I explained the diagnosis, etiology and natural history of rotator cuff tendinitis and impingement.  Given the short duration of symptoms and moderate findings on exam, I would recommend physical therapy for scapular stabilization exercises and rotator cuff strengthening, followed by a home program.  Activity modification,  anti-inflammatories and the prescribed physical therapy regimen should be effective over the course of 6 to 8 weeks.  Patient informational handout was also provided.  All questions were answered and the patient expressed understanding and appreciation.  If symptoms persist or worsen despite initial conservative care, the patient was asked to follow-up for reassessment.     Gail Mcdaniel MD, [facility]  Orthopaedic Surgery   Sports Medicine/Knee and Shoulder  [facility]  t: [phone]  f: [phone]               This document was partially prepared using Dragon Medical voice recognition software.  Although every attempt is made to correct errors during dictation, discrepancies may still exist.                       .       [1] History reviewed. No pertinent past medical history.  [2]   Past Surgical History:  Procedure Laterality Date    Repeat root canal molar      age 11    Tonsillectomy Bilateral     age 6   [3]   Current Outpatient Medications   Medication Sig Dispense Refill    omeprazole 20 MG Oral Capsule Delayed Release Take 1 capsule (20 mg total) by mouth daily as needed.      Multiple Vitamins-Minerals (IMMUNE SUPPORT OR) Take 1 tablet by mouth daily as needed.     [4] No Known Allergies  [5]   Family History  Problem Relation Age of Onset    Hypertension Mother     Lipids Mother     Other (Headache) Mother     Other (GERD) Mother     Diabetes Father     Hypertension Father     Lipids Father     Other (GERD) Father     Diabetes Maternal Grandmother     Diabetes Maternal Grandfather     Diabetes Paternal Grandmother     Diabetes Paternal Grandfather

## 2025-07-29 ENCOUNTER — OFFICE VISIT (OUTPATIENT)
Dept: PHYSICAL THERAPY | Facility: HOSPITAL | Age: 25
End: 2025-07-29
Attending: ORTHOPAEDIC SURGERY
Payer: COMMERCIAL

## 2025-07-29 ENCOUNTER — TELEPHONE (OUTPATIENT)
Dept: PHYSICAL THERAPY | Facility: HOSPITAL | Age: 25
End: 2025-07-29

## 2025-07-29 DIAGNOSIS — M25.811 IMPINGEMENT OF RIGHT SHOULDER: Primary | ICD-10-CM

## 2025-07-29 PROCEDURE — 97110 THERAPEUTIC EXERCISES: CPT

## 2025-07-29 PROCEDURE — 97161 PT EVAL LOW COMPLEX 20 MIN: CPT

## 2025-08-05 ENCOUNTER — OFFICE VISIT (OUTPATIENT)
Dept: PHYSICAL THERAPY | Facility: HOSPITAL | Age: 25
End: 2025-08-05
Attending: ORTHOPAEDIC SURGERY

## 2025-08-05 PROCEDURE — 97112 NEUROMUSCULAR REEDUCATION: CPT

## 2025-08-05 PROCEDURE — 97110 THERAPEUTIC EXERCISES: CPT

## 2025-08-05 PROCEDURE — 97140 MANUAL THERAPY 1/> REGIONS: CPT

## 2025-08-08 ENCOUNTER — OFFICE VISIT (OUTPATIENT)
Dept: PHYSICAL THERAPY | Facility: HOSPITAL | Age: 25
End: 2025-08-08
Attending: ORTHOPAEDIC SURGERY

## 2025-08-08 PROCEDURE — 97112 NEUROMUSCULAR REEDUCATION: CPT | Performed by: PHYSICAL THERAPIST

## 2025-08-08 PROCEDURE — 97530 THERAPEUTIC ACTIVITIES: CPT | Performed by: PHYSICAL THERAPIST

## 2025-08-08 PROCEDURE — 97110 THERAPEUTIC EXERCISES: CPT | Performed by: PHYSICAL THERAPIST

## 2025-08-12 ENCOUNTER — TELEPHONE (OUTPATIENT)
Dept: PHYSICAL THERAPY | Facility: HOSPITAL | Age: 25
End: 2025-08-12

## 2025-08-18 ENCOUNTER — TELEPHONE (OUTPATIENT)
Dept: PHYSICAL THERAPY | Facility: HOSPITAL | Age: 25
End: 2025-08-18

## 2025-08-19 ENCOUNTER — OFFICE VISIT (OUTPATIENT)
Dept: PHYSICAL THERAPY | Facility: HOSPITAL | Age: 25
End: 2025-08-19
Attending: ORTHOPAEDIC SURGERY

## 2025-08-19 PROCEDURE — 97112 NEUROMUSCULAR REEDUCATION: CPT

## 2025-08-28 ENCOUNTER — OFFICE VISIT (OUTPATIENT)
Dept: PHYSICAL THERAPY | Facility: HOSPITAL | Age: 25
End: 2025-08-28
Attending: ORTHOPAEDIC SURGERY

## 2025-08-28 PROCEDURE — 97110 THERAPEUTIC EXERCISES: CPT | Performed by: PHYSICAL THERAPIST

## 2025-08-28 PROCEDURE — 97112 NEUROMUSCULAR REEDUCATION: CPT | Performed by: PHYSICAL THERAPIST

## 2025-08-28 PROCEDURE — 97530 THERAPEUTIC ACTIVITIES: CPT | Performed by: PHYSICAL THERAPIST
